# Patient Record
Sex: MALE | Race: WHITE | NOT HISPANIC OR LATINO | Employment: FULL TIME | URBAN - METROPOLITAN AREA
[De-identification: names, ages, dates, MRNs, and addresses within clinical notes are randomized per-mention and may not be internally consistent; named-entity substitution may affect disease eponyms.]

---

## 2023-08-23 ASSESSMENT — ACTIVITIES OF DAILY LIVING (ADL)
HOW_WOULD_YOU_RATE_YOUR_CURRENT_LEVEL_OF_FUNCTION_DURING_YOUR_USUAL_ACTIVITIES_OF_DAILY_LIVING_FROM_0_TO_100_WITH_100_BEING_YOUR_LEVEL_OF_FUNCTION_PRIOR_TO_YOUR_HIP_PROBLEM_AND_0_BEING_THE_INABILITY_TO_PERFORM_ANY_OF_YOUR_USUAL_DAILY_ACTIVITIES?: 60
SITTING FOR 15 MINUTES: SLIGHT DIFFICULTY
LIGHT_TO_MODERATE_WORK: MODERATE DIFFICULTY
ROLLING OVER IN BED: MODERATE DIFFICULTY
RECREATIONAL ACTIVITIES: SLIGHT DIFFICULTY
PUTTING ON SOCKS AND SHOES: SLIGHT DIFFICULTY
WALKING_UP_STEEP_HILLS: NO DIFFICULTY AT ALL
WALKING_15_MINUTES_OR_GREATER: NO DIFFICULTY AT ALL
HEAVY_WORK: SLIGHT DIFFICULTY
STANDING FOR 15 MINUTES: SLIGHT DIFFICULTY
HOS_ADL_SCORE(%): 79.69
GETTING INTO AND OUT OF AN AVERAGE CAR: MODERATE DIFFICULTY
STEPPING UP AND DOWN CURBS: NO DIFFICULTY AT ALL
DEEP SQUATTING: SLIGHT DIFFICULTY
WALKING_DOWN_STEEP_HILLS: NO DIFFICULTY AT ALL
GOING UP 1 FLIGHT OF STAIRS: NO DIFFICULTY AT ALL
HOS_ADL_ITEM_SCORE_TOTAL: 51
WALKING_APPROXIMATELY_10_MINUTES: NO DIFFICULTY AT ALL
WALKING_INITIALLY: SLIGHT DIFFICULTY
GOING DOWN 1 FLIGHT OF STAIRS: NO DIFFICULTY AT ALL
HOS_ADL_HIGHEST_POTENTIAL_SCORE: 64
TWISTING/PIVOTING ON INVOLVED LEG: MODERATE DIFFICULTY

## 2023-08-24 ENCOUNTER — THERAPY VISIT (OUTPATIENT)
Dept: PHYSICAL THERAPY | Facility: CLINIC | Age: 28
End: 2023-08-24
Payer: COMMERCIAL

## 2023-08-24 DIAGNOSIS — M25.552 HIP PAIN, LEFT: Primary | ICD-10-CM

## 2023-08-24 PROCEDURE — 97110 THERAPEUTIC EXERCISES: CPT | Mod: GP | Performed by: PHYSICAL THERAPIST

## 2023-08-24 PROCEDURE — 97161 PT EVAL LOW COMPLEX 20 MIN: CPT | Mod: GP | Performed by: PHYSICAL THERAPIST

## 2023-08-24 NOTE — PROGRESS NOTES
"PHYSICAL THERAPY EVALUATION  Type of Visit: Evaluation    See electronic medical record for Abuse and Falls Screening details.    Subjective       Presenting condition or subjective complaint: Piriformis / Sciatica pain  Date of onset: 04/24/23    Relevant medical history:     Dates & types of surgery:      Pt reports pain has been present for about four months (on or about 04/24/2023) without specific incident.  Has had off and on bouts of \"sciatica\" over the years but this feels different.  Pain is deep in L buttock.  Tough sitting, sit to stand (and first 10-15 seconds of walking), rolling in bed, getting in and out of car.  Can be better with piriformis stretching but is only temporary.  Worse first thing in the morning.  Pain pretty localized to that spot and is occasionally sharp.  May be getting a bit worse    Prior diagnostic imaging/testing results:       Prior therapy history for the same diagnosis, illness or injury: No      Prior Level of Function  Transfers: Independent  Ambulation: Independent  ADL: Independent    Living Environment  Social support: With a significant other or spouse   Type of home: House   Stairs to enter the home: Yes 5 Is there a railing: Yes   Ramp: No   Stairs inside the home: Yes 15 Is there a railing: Yes   Help at home:    Equipment owned:       Employment: Yes   Hobbies/Interests: golf, hiking, exercise, cooking    Patient goals for therapy: Sit, stand up from sitting, be comfortable, roll over in bed       Objective   LUMBAR SPINE EVALUATION  INTEGUMENTARY (edema, incisions): WNL  POSTURE: Sitting Posture: Lordosis decreased  GAIT: Pt ambulates without device without gross asymmetry  ROM: Lumbar flexion without restriction, increased / no worse on single and repetition.  Lumbar extension with mild restriction, increased / no worse on single and repetition.  No restriction or symptoms with B SG.  REIL decrease / no better.  PELVIC/SI SCREEN: Negative Grant, " thigh thrust, FADIR B.  STRENGTH: TA MMT 1/5.  MYOTOMES: 5/5 MMT B hip flexion, knee flexion and extension, ankle DF and eversion, great toe extension.  DERMATOMES: WNL  NEURAL TENSION: Positive SLR and slump in sitting and supine  FLEXIBILITY: Decreased hamstrings L, Decreased hamstrings R  LUMBAR/HIP Special Tests: Negative PROM hip IR and ER B.   PALPATION: No tenderness to palpation.  SPINAL SEGMENTAL CONCLUSIONS: Negative PAs L1-5.    Assessment & Plan   CLINICAL IMPRESSIONS  Medical Diagnosis: L hip pain    Treatment Diagnosis: L hip pain   Impression/Assessment: Patient is a 28 year old male with L hip complaints.  The following significant findings have been identified: Pain, Decreased ROM/flexibility, Decreased strength, and Impaired posture. These impairments interfere with their ability to perform self care tasks as compared to previous level of function.     Clinical Decision Making (Complexity):  Clinical Presentation: Stable/Uncomplicated  Clinical Presentation Rationale: based on medical and personal factors listed in PT evaluation  Clinical Decision Making (Complexity): Low complexity    PLAN OF CARE  Treatment Interventions:  Interventions: Manual Therapy, Neuromuscular Re-education, Therapeutic Activity, Therapeutic Exercise    Long Term Goals     PT Goal 1  Goal Description: Minutes pt will be able to sit: 60  Rationale: to maximize safety and independence within the community  Goal Progress: Minutes pt can sit: 30  Target Date: 10/05/23      Frequency of Treatment: once per week  Duration of Treatment: six weeks    Education Assessment:   Learner/Method: Patient  Education Comments: Performance in clinic    Risks and benefits of evaluation/treatment have been explained.   Patient/Family/caregiver agrees with Plan of Care.     Evaluation Time:     PT Eval, Low Complexity Minutes (54181): 25     Signing Clinician: Karl Mcpherson PT

## 2023-09-13 ENCOUNTER — THERAPY VISIT (OUTPATIENT)
Dept: PHYSICAL THERAPY | Facility: CLINIC | Age: 28
End: 2023-09-13
Payer: COMMERCIAL

## 2023-09-13 DIAGNOSIS — M25.552 HIP PAIN, LEFT: Primary | ICD-10-CM

## 2023-09-13 PROCEDURE — 97140 MANUAL THERAPY 1/> REGIONS: CPT | Mod: GP | Performed by: PHYSICAL THERAPIST

## 2023-09-13 PROCEDURE — 97110 THERAPEUTIC EXERCISES: CPT | Mod: GP | Performed by: PHYSICAL THERAPIST

## 2023-09-27 ENCOUNTER — THERAPY VISIT (OUTPATIENT)
Dept: PHYSICAL THERAPY | Facility: CLINIC | Age: 28
End: 2023-09-27
Payer: COMMERCIAL

## 2023-09-27 DIAGNOSIS — M25.552 HIP PAIN, LEFT: Primary | ICD-10-CM

## 2023-09-27 PROCEDURE — 97110 THERAPEUTIC EXERCISES: CPT | Mod: GP | Performed by: PHYSICAL THERAPIST

## 2023-09-27 NOTE — PROGRESS NOTES
09/27/23 0500   Appointment Info   Signing clinician's name / credentials Karl Mcpherson PT   Total/Authorized Visits 6   Visits Used 3   Medical Diagnosis L hip pain   PT Tx Diagnosis L hip pain   Quick Adds Self Referred   Self Referred   Self Referred (PT) Yes   MD order needed by: 11/22/2023   Progress Note/Certification   Start of Care Date 08/24/23   Onset of illness/injury or Date of Surgery 04/24/23   Therapy Frequency once per week   Predicted Duration six weeks   Progress Note Completed Date 08/24/23   PT Goal 1   Goal Description Minutes pt will be able to sit: 60   Rationale to maximize safety and independence within the community   Goal Progress No restrictions   Target Date 10/05/23   Date Met 09/27/23   Subjective Report   Subjective Report Pt reports significant improvement.  Really hasn't had pain for about five days.  Sleeping much better.   Objective Measure 1   Details No tenderness to palpation.  Negative lumbar AROM all directions.  Positive slump L.   Treatment Interventions (PT)   Interventions Therapeutic Procedure/Exercise   Therapeutic Procedure/Exercise   Therapeutic Procedures: strength, endurance, ROM, flexibillity minutes (70006) 15   PTRx Ther Proc 1 Seated Piriformis Stretch   PTRx Ther Proc 1 - Details 09/27 Pt indicates this doesn't provide much of a stretch any longer.  Can continue as desired.   PTRx Ther Proc 2 Standing Hamstring Stretch   PTRx Ther Proc 2 - Details 09/27 Encourage consistency.   PTRx Ther Proc 3 Supine Hamstring Stretch   PTRx Ther Proc 3 - Details 09/27 OK to continue.   PTRx Ther Proc 4 Short Arc Knee Extension   PTRx Ther Proc 4 - Details 09/27 Pt indicates he feels like he can move farther now than previous.  Can add ankle DF and cervical flexion over time as this gets easier.   PTRx Ther Proc 5 Clamshell Feet together   PTRx Ther Proc 5 - Details 09/27 OK to take out given progression.   PTRx Ther Proc 6 Gluteal Myofascial Piriformis Cruncher   PTRx  Ther Proc 6 - Details 09/27 OK to take out given progression.   Skilled Intervention Implementation of HEP for independence moving forward.   Patient Response/Progress See above.   PTRx Ther Proc 7 Monster Walks   PTRx Ther Proc 7 - Details 09/27 Added today with green band.  Pt noted was challenging but not painful.  Can gradually lower the band as it gets easier.   PTRx Ther Proc 8 Side Stepping With Theraband   PTRx Ther Proc 8 - Details 09/27 Added today with green band.  Pt noted was challenging but not painful.  Can gradually lower the band as it gets easier.   Total Session Time   Timed Code Treatment Minutes 15   Total Treatment Time (sum of timed and untimed services) 15         DISCHARGE  Reason for Discharge: Patient has met all goals.    Equipment Issued: N/A    Discharge Plan: Given progress, pt agrees discharge to HEP but will let me know if there are further issues.    Referring Provider:  Referred Self

## 2023-10-22 ENCOUNTER — HEALTH MAINTENANCE LETTER (OUTPATIENT)
Age: 28
End: 2023-10-22

## 2023-12-31 ASSESSMENT — ENCOUNTER SYMPTOMS
NERVOUS/ANXIOUS: 0
JOINT SWELLING: 0
DIARRHEA: 0
NAUSEA: 0
ABDOMINAL PAIN: 0
FREQUENCY: 0
DIZZINESS: 0
COUGH: 0
FEVER: 0
CHILLS: 0
HEARTBURN: 0
WEAKNESS: 0
DYSURIA: 0
HEMATOCHEZIA: 0
CONSTIPATION: 0
EYE PAIN: 0
ARTHRALGIAS: 0
HEMATURIA: 0
PARESTHESIAS: 0
MYALGIAS: 0
SORE THROAT: 0
SHORTNESS OF BREATH: 0
HEADACHES: 0
PALPITATIONS: 0

## 2024-01-04 ENCOUNTER — OFFICE VISIT (OUTPATIENT)
Dept: FAMILY MEDICINE | Facility: CLINIC | Age: 29
End: 2024-01-04
Payer: COMMERCIAL

## 2024-01-04 VITALS
HEART RATE: 85 BPM | TEMPERATURE: 99.1 F | OXYGEN SATURATION: 96 % | DIASTOLIC BLOOD PRESSURE: 74 MMHG | WEIGHT: 220 LBS | BODY MASS INDEX: 29.8 KG/M2 | HEIGHT: 72 IN | SYSTOLIC BLOOD PRESSURE: 127 MMHG

## 2024-01-04 DIAGNOSIS — Z00.00 ROUTINE GENERAL MEDICAL EXAMINATION AT A HEALTH CARE FACILITY: Primary | ICD-10-CM

## 2024-01-04 DIAGNOSIS — Z11.59 NEED FOR HEPATITIS C SCREENING TEST: ICD-10-CM

## 2024-01-04 DIAGNOSIS — Z11.4 SCREENING FOR HIV (HUMAN IMMUNODEFICIENCY VIRUS): ICD-10-CM

## 2024-01-04 DIAGNOSIS — Z23 NEED FOR TDAP VACCINATION: ICD-10-CM

## 2024-01-04 DIAGNOSIS — Z23 INFLUENZA VACCINE NEEDED: ICD-10-CM

## 2024-01-04 DIAGNOSIS — Z11.4 ENCOUNTER FOR SCREENING FOR HIV: ICD-10-CM

## 2024-01-04 PROCEDURE — 86803 HEPATITIS C AB TEST: CPT | Performed by: NURSE PRACTITIONER

## 2024-01-04 PROCEDURE — 90686 IIV4 VACC NO PRSV 0.5 ML IM: CPT | Performed by: NURSE PRACTITIONER

## 2024-01-04 PROCEDURE — 90715 TDAP VACCINE 7 YRS/> IM: CPT | Performed by: NURSE PRACTITIONER

## 2024-01-04 PROCEDURE — 87389 HIV-1 AG W/HIV-1&-2 AB AG IA: CPT | Performed by: NURSE PRACTITIONER

## 2024-01-04 PROCEDURE — 99385 PREV VISIT NEW AGE 18-39: CPT | Mod: 25 | Performed by: NURSE PRACTITIONER

## 2024-01-04 PROCEDURE — 90472 IMMUNIZATION ADMIN EACH ADD: CPT | Performed by: NURSE PRACTITIONER

## 2024-01-04 PROCEDURE — 80048 BASIC METABOLIC PNL TOTAL CA: CPT | Performed by: NURSE PRACTITIONER

## 2024-01-04 PROCEDURE — 90471 IMMUNIZATION ADMIN: CPT | Performed by: NURSE PRACTITIONER

## 2024-01-04 PROCEDURE — 36415 COLL VENOUS BLD VENIPUNCTURE: CPT | Performed by: NURSE PRACTITIONER

## 2024-01-04 ASSESSMENT — ENCOUNTER SYMPTOMS
NERVOUS/ANXIOUS: 0
EYE PAIN: 0
FREQUENCY: 0
SHORTNESS OF BREATH: 0
CONSTIPATION: 0
FEVER: 0
PALPITATIONS: 0
SORE THROAT: 0
MYALGIAS: 0
HEARTBURN: 0
DIZZINESS: 0
DIARRHEA: 0
ABDOMINAL PAIN: 0
HEMATOCHEZIA: 0
PARESTHESIAS: 0
HEMATURIA: 0
WEAKNESS: 0
HEADACHES: 0
CHILLS: 0
JOINT SWELLING: 0
ARTHRALGIAS: 0
DYSURIA: 0
NAUSEA: 0
COUGH: 0

## 2024-01-04 ASSESSMENT — PAIN SCALES - GENERAL: PAINLEVEL: NO PAIN (0)

## 2024-01-04 NOTE — LETTER
January 8, 2024    Emmanuel Perez  [address] [address]          Dear ,    We are writing to inform you of your test results.    Lab results are normal. Continue a healthy lifestyle.       Resulted Orders   Hepatitis C Screen Reflex to HCV RNA Quant and Genotype   Result Value Ref Range    Hepatitis C Antibody Nonreactive Nonreactive      Comment:      A nonreactive screening test result does not exclude the possibility of exposure to or infection with HCV. Nonreactive screening test results in individuals with prior exposure to HCV may be due to antibody levels below the limit of detection of this assay or lack of reactivity to the HCV antigens used in this assay. Patients with recent HCV infections (<3 months from time of exposure) may have false-negative HCV antibody results due to the time needed for seroconversion (average of 8 to 9 weeks).   Basic metabolic panel  (Ca, Cl, CO2, Creat, Gluc, K, Na, BUN)   Result Value Ref Range    Sodium 140 135 - 145 mmol/L      Comment:      Reference intervals for this test were updated on 09/26/2023 to more accurately reflect our healthy population. There may be differences in the flagging of prior results with similar values performed with this method. Interpretation of those prior results can be made in the context of the updated reference intervals.     Potassium 4.6 3.4 - 5.3 mmol/L    Chloride 103 98 - 107 mmol/L    Carbon Dioxide (CO2) 27 22 - 29 mmol/L    Anion Gap 10 7 - 15 mmol/L    Urea Nitrogen 14.2 6.0 - 20.0 mg/dL    Creatinine 1.06 0.67 - 1.17 mg/dL    GFR Estimate >90 >60 mL/min/1.73m2    Calcium 9.7 8.6 - 10.0 mg/dL    Glucose 88 70 - 99 mg/dL   HIV Antigen Antibody Combo   Result Value Ref Range    HIV Antigen Antibody Combo Nonreactive Nonreactive      Comment:      Negative HIV-1/-2 antigen and antibody screening test results usually indicate the absence of HIV-1 and HIV-2 infection. However, such negative results do  not rule-out acute HIV infection.  If acute HIV-1 or HIV-2 infection is suspected, detection of HIV-1 or HIV-2 RNA  is recommended.        If you have any questions or concerns, please call the clinic at the number listed above.       Sincerely,      JEREL Vargas CNP

## 2024-01-04 NOTE — COMMUNITY RESOURCES LIST (ENGLISH)
01/04/2024   [employer]  N/A  For questions about this resource list or additional care needs, please contact your primary care clinic or care manager.  Phone: [phone]   Email: N/A   Address: [facility] [place] [address]   Hours: N/A        Hotlines and Helplines       Hotline - Housing crisis  1  [facility] Housing Resource Line Distance: 7.32 miles      Phone/Virtual   [address] [place] [address]  Language: English  Hours: Mon - Sun Open 24 Hours   Phone: (467) 126-7315 Website: [email or web address].[email or web address]/Basic-Needs     2  [facility] Distance: 11.22 miles      Phone/Virtual   [address] [place] [address]  Language: English  Hours: Mon - Sun Open 24 Hours   Phone: (509) 502-2064 Email: communications@[facility].org Website: https://[facility].org/oursaviourshousing/          Housing       Coordinated Entry access point  3  [facility]  Office - [facility] Distance: 2.24 miles      Phone/Virtual   [address] [place] [address]  Language: English  Hours: Mon - Fri 8:30 AM - 12:00 PM , Mon - Fri 1:00 PM - 4:00 PM  Fees: Free   Phone: (586) 759-3166 Ext.2 Email: sherry@[facility].org Website: https://www.[facility].org/usn/     4  [facility] Housing Services Distance: 11.32 miles      In-Person   [address] [place] [address]  Language: English  Hours: Mon - Fri 9:00 AM - 5:00 PM  Fees: Free   Phone: (613) 670-7011 Email: housing@[facility].org Website: http://www.[facility].org/housing     Drop-in center or day shelter  5  [facility] Distance: 9.58 miles      In-Person   [address] [place] [address]  Language: English, Hmong, Sierra Leonean, Japanese  Hours: Mon - Thu 8:30 AM - 4:30 PM , Sat - Sun 9:00 AM - 12:00 PM  Fees: Free   Phone: (952) 139-8381 Email: info@[employer].org Website: https://[employer].org/     6  [facility]  [facility] Distance: 10.79 miles      In-Person   [address] [place] [address]  Language: English, Hong Konger, Macanese  Hours: Mon - Sat 7:00 AM - 3:00 PM  Fees: Free, Self Pay   Phone: (454) 460-6016 Email: info@[employer] Website: https://www.[employer]/locations/[facility]/     Housing search assistance  7  [facility] ([facility] - [facility] Rental Housing Distance: 2.26 miles      In-Person, Phone/Virtual   [address] [place] [address]  Language: English  Hours: Mon - Fri 8:00 AM - 4:30 PM  Fees: Free   Phone: (878) 664-6220 Email: accap@[facility].org Website: http://www.[employer]     8  [facility] Distance: 3.88 miles      Phone/Virtual   [address] [place] [address]  Language: English, Macanese  Hours: Mon - Fri 9:00 AM - 5:00 PM  Fees: Free   Phone: (480) 649-7602 Email: services@[employer] Website: https://www.[employer]     Shelter for families  9  [facility] Distance: 14.34 miles      In-Person   [address] [place] [address]  Language: English  Hours: Mon - Fri 3:00 PM - 9:00 AM , Sat - Sun Open 24 Hours  Fees: Free   Phone: (445) 171-9220 Ext.1 Website: https://www.[facility].org/2020/07/03/emergency-family-shelter/     Shelter for individuals  10  [facility] Distance: 9.89 miles      In-Person   [address] [place] [address]  Language: English  Hours: Mon - Sun 5:00 PM - 10:00 AM  Fees: Free, Self Pay   Phone: (969) 510-2086 Email: info@[employer] Website: https://www.[employer]/locations/higher-ground-shelter/     11  [facility] Distance: 9.93 miles      In-Person   [address] [place] [address]  Language: English  Hours: Mon - Fri 4:00 PM - 9:00 AM  Fees: Free    Phone: (330) 685-4066 Email: fabien@[facility].[employer].org Website: https://[facility].org/[facility]/          Important Numbers & Websites       Emergency Services   911  [facility] Services   311  Poison Control   (586) 984-3174  Suicide Prevention Lifeline   (740) 668-5905 (TALK)  Child Abuse Hotline   (310) 572-7383 (4-A-Child)  Sexual Assault Hotline   (236) 158-5079 (HOPE)  National Runaway Safeline   (257) 723-5733 (RUNAWAY)  All-Options Talkline   (138) 232-4349  Substance Abuse Referral   (384) 916-1587 (HELP)

## 2024-01-04 NOTE — PROGRESS NOTES
SUBJECTIVE:   Emmanuel is a 28 year old, presenting for the following:  Physical  Liborio Joseph Perez 28 year old male presenting for an annual physical examination.   Pt reports feeling generally well with no acute complaints.  Pt mentions a stable exercise routine and a balanced diet.  Pt is not up to date on his immunizations and wants to take influenza and TDAP vaccines.       1/4/2024     2:52 PM   Additional Questions   Roomed by cam   Accompanied by none         1/4/2024     2:52 PM   Patient Reported Additional Medications   Patient reports taking the following new medications none       Healthy Habits:     Getting at least 3 servings of Calcium per day:  Yes    Bi-annual eye exam:  Yes    Dental care twice a year:  Yes    Sleep apnea or symptoms of sleep apnea:  None    Diet:  Regular (no restrictions)    Frequency of exercise:  4-5 days/week    Duration of exercise:  30-45 minutes    Taking medications regularly:  Not Applicable    Barriers to taking medications:  Not applicable    Medication side effects:  Not applicable    Additional concerns today:  No      Today's PHQ-2 Score:       1/4/2024     2:41 PM   PHQ-2 ( 1999 [employer])   Q1: Little interest or pleasure in doing things 0   Q2: Feeling down, depressed or hopeless 0   PHQ-2 Score 0   Q1: Little interest or pleasure in doing things Not at all   Q2: Feeling down, depressed or hopeless Not at all   PHQ-2 Score 0                   Have you ever done Advance Care Planning? (For example, a Health Directive, POLST, or a discussion with a medical provider or your loved ones about your wishes): No, advance care planning information given to patient to review.  Patient plans to discuss their wishes with loved ones or provider.      Social History     Tobacco Use    Smoking status: Never    Smokeless tobacco: Never   Substance Use Topics    Alcohol use: Not on file             12/31/2023     1:29 PM   Alcohol Use   Prescreen: >3 drinks/day or >7 drinks/week?  "No       Last PSA: No results found for: \"PSA\"    Reviewed orders with patient. Reviewed health maintenance and updated orders accordingly - Yes  Lab work is in process  Labs reviewed in EPIC  BP Readings from Last 3 Encounters:   01/04/24 127/74    Wt Readings from Last 3 Encounters:   01/04/24 99.8 kg (220 lb)                  Patient Active Problem List   Diagnosis    Hip pain, left     History reviewed. No pertinent surgical history.    Social History     Tobacco Use    Smoking status: Never    Smokeless tobacco: Never   Substance Use Topics    Alcohol use: Not on file     Family History   Problem Relation Age of Onset    Hypertension Mother     Thyroid Disease Father     Hashimoto's thyroiditis Sister     Dementia Maternal Grandmother     Alzheimer Disease Paternal Grandmother          No current outpatient medications on file.     No Known Allergies  No lab results found.     Reviewed and updated as needed this visit by clinical staff   Tobacco  Allergies  Meds              Reviewed and updated as needed this visit by Provider                 History reviewed. No pertinent past medical history.   History reviewed. No pertinent surgical history.    Review of Systems   Constitutional:  Negative for chills and fever.   HENT:  Negative for congestion, ear pain, hearing loss and sore throat.    Eyes:  Negative for pain and visual disturbance.   Respiratory:  Negative for cough and shortness of breath.    Cardiovascular:  Negative for chest pain, palpitations and peripheral edema.   Gastrointestinal:  Negative for abdominal pain, constipation, diarrhea, heartburn, hematochezia and nausea.   Genitourinary:  Negative for dysuria, frequency, genital sores, hematuria, impotence, penile discharge and urgency.   Musculoskeletal:  Negative for arthralgias, joint swelling and myalgias.   Skin:  Negative for rash.   Neurological:  Negative for dizziness, weakness, headaches and paresthesias.   Psychiatric/Behavioral:  " Negative for mood changes. The patient is not nervous/anxious.          OBJECTIVE:   There were no vitals taken for this visit.    Physical Exam  GENERAL: healthy, alert and no distress  EYES: Eyes grossly normal to inspection, PERRL and conjunctivae and sclerae normal  HENT: ear canals and TM's normal, nose and mouth without ulcers or lesions  NECK: no adenopathy, no asymmetry, masses, or scars and thyroid normal to palpation  RESP: lungs clear to auscultation - no rales, rhonchi or wheezes  CV: regular rate and rhythm, normal S1 S2, no S3 or S4, no murmur, click or rub, no peripheral edema and peripheral pulses strong  ABDOMEN: soft, nontender, no hepatosplenomegaly, no masses and bowel sounds normal  MS: no gross musculoskeletal defects noted, no edema  SKIN: no suspicious lesions or rashes  NEURO: Normal strength and tone, mentation intact and speech normal  PSYCH: mentation appears normal, affect normal/bright    Diagnostic Test Results:  Labs reviewed in Epic    ASSESSMENT/PLAN:   (Z00.00) Routine general medical examination at a health care facility  (primary encounter diagnosis)  Plan: REVIEW OF HEALTH MAINTENANCE PROTOCOL ORDERS,         Basic metabolic panel  (Ca, Cl, CO2, Creat,         Gluc, K, Na, BUN)  -Reviewed recommendations for screening, immunizations, and discussed healthy lifestyle choices.  -Counseled patient on medication adherence   -F/u 1 year for next annual physical  - RTO sooner prn       (Z11.59) Need for hepatitis C screening test  Plan: Hepatitis C Screen Reflex to HCV RNA Quant and         Genotype      (Z23) Need for Tdap vaccination  Plan: TDAP 7+ (ADACEL,BOOSTRIX), SCREENING QUESTIONS         FOR ADULT IMMUNIZATIONS    (Z23) Influenza vaccine needed  Plan: INFLUENZA VACCINE IM > 6 MONTHS VALENT IIV4         (AFLURIA/FLUZONE), SCREENING QUESTIONS FOR         ADULT IMMUNIZATIONS      (Z11.4) Encounter for screening for HIV  Plan: HIV Antigen Antibody Combo            COUNSELING:    Reviewed preventive health counseling, as reflected in patient instructions       Regular exercise       Healthy diet/nutrition       Vision screening       Immunizations  Vaccinated for: Influenza and TDAP and Declined: Covid-19 due to Other wants to take Influenza and TDAP today           He reports that he has never smoked. He has never used smokeless tobacco.            JEREL Vargas CNP  [facility]

## 2024-01-05 LAB
ANION GAP SERPL CALCULATED.3IONS-SCNC: 10 MMOL/L (ref 7–15)
BUN SERPL-MCNC: 14.2 MG/DL (ref 6–20)
CALCIUM SERPL-MCNC: 9.7 MG/DL (ref 8.6–10)
CHLORIDE SERPL-SCNC: 103 MMOL/L (ref 98–107)
CREAT SERPL-MCNC: 1.06 MG/DL (ref 0.67–1.17)
DEPRECATED HCO3 PLAS-SCNC: 27 MMOL/L (ref 22–29)
EGFRCR SERPLBLD CKD-EPI 2021: >90 ML/MIN/1.73M2
GLUCOSE SERPL-MCNC: 88 MG/DL (ref 70–99)
HCV AB SERPL QL IA: NONREACTIVE
HIV 1+2 AB+HIV1 P24 AG SERPL QL IA: NONREACTIVE
POTASSIUM SERPL-SCNC: 4.6 MMOL/L (ref 3.4–5.3)
SODIUM SERPL-SCNC: 140 MMOL/L (ref 135–145)

## 2024-04-22 ENCOUNTER — OFFICE VISIT (OUTPATIENT)
Dept: FAMILY MEDICINE | Facility: CLINIC | Age: 29
End: 2024-04-22
Payer: COMMERCIAL

## 2024-04-22 VITALS
SYSTOLIC BLOOD PRESSURE: 133 MMHG | WEIGHT: 220.6 LBS | TEMPERATURE: 99.5 F | HEART RATE: 94 BPM | OXYGEN SATURATION: 97 % | DIASTOLIC BLOOD PRESSURE: 71 MMHG | BODY MASS INDEX: 30.14 KG/M2

## 2024-04-22 DIAGNOSIS — J02.9 SORE THROAT: ICD-10-CM

## 2024-04-22 DIAGNOSIS — R05.1 ACUTE COUGH: Primary | ICD-10-CM

## 2024-04-22 LAB
DEPRECATED S PYO AG THROAT QL EIA: NEGATIVE
FLUAV RNA SPEC QL NAA+PROBE: NEGATIVE
FLUBV RNA RESP QL NAA+PROBE: NEGATIVE
GROUP A STREP BY PCR: NOT DETECTED
RSV RNA SPEC NAA+PROBE: NEGATIVE
SARS-COV-2 RNA RESP QL NAA+PROBE: NEGATIVE

## 2024-04-22 PROCEDURE — 87637 SARSCOV2&INF A&B&RSV AMP PRB: CPT

## 2024-04-22 PROCEDURE — 87651 STREP A DNA AMP PROBE: CPT

## 2024-04-22 PROCEDURE — 99213 OFFICE O/P EST LOW 20 MIN: CPT

## 2024-04-22 RX ORDER — AZITHROMYCIN 250 MG/1
TABLET, FILM COATED ORAL
Qty: 6 TABLET | Refills: 0 | Status: SHIPPED | OUTPATIENT
Start: 2024-04-22 | End: 2024-04-27

## 2024-04-22 ASSESSMENT — ENCOUNTER SYMPTOMS: COUGH: 1

## 2024-04-22 NOTE — PROGRESS NOTES
"  Assessment & Plan     Acute cough  Influenza, COVID and RSV collected. If positive, will treat accordingly. If negative, due to wedding this weekend, plan for patient to use azithromycin. Discussed that we are unable to rule out viral nature and that antibiotic may not work and patient is agreeable. Discussed supportive measures.   - azithromycin (ZITHROMAX) 250 MG tablet; Take 2 tablets (500 mg) by mouth daily for 1 day, THEN 1 tablet (250 mg) daily for 4 days.  - Symptomatic Influenza A/B, RSV, & SARS-CoV2 PCR (COVID-19) Nose    Sore throat  Rapid strep negative. Awaiting PCR.   - Streptococcus A Rapid Screen w/Reflex to PCR - Clinic Collect  - Group A Streptococcus PCR Throat Swab            BMI  Estimated body mass index is 30.14 kg/m  as calculated from the following:    Height as of 1/4/24: 1.822 m (5' 11.73\").    Weight as of this encounter: 100.1 kg (220 lb 9.6 oz).             Kentrell Benitez is a 29 year old, presenting for the following health issues:  Cough      4/22/2024     1:49 PM   Additional Questions   Roomed by linda Krause    History of Present Illness       Reason for visit:  Sore throat/productive cough  Symptom onset:  Today  Symptoms include:  Sore throat, productive cough, headache  Symptom intensity:  Moderate  Symptom progression:  Staying the same  Had these symptoms before:  No    He eats 2-3 servings of fruits and vegetables daily.He consumes 0 sweetened beverage(s) daily.He exercises with enough effort to increase his heart rate 30 to 60 minutes per day.  He exercises with enough effort to increase his heart rate 6 days per week.   He is taking medications regularly.         Acute Illness  Acute illness concerns: cough, sore throat,   Onset/Duration: last night and this morning  Symptoms:  Fever: YES  Chills/Sweats: YES- chills  Headache (location?): YES- frontal  Sinus Pressure: No  Conjunctivitis:  No  Ear Pain: YES- maybe  Rhinorrhea: No  Congestion: YES  Sore Throat: " YES  Cough: YES-productive  Wheeze: No  Decreased Appetite: No  Nausea: No  Vomiting: No  Diarrhea: No  Dysuria/Freq.: No  Dysuria or Hematuria: No  Fatigue/Achiness: YES  Sick/Strep Exposure: YES- fiance was sick  Therapies tried and outcome: None    Pt is getting  this Saturday and would like to know if there is anything he can do.         Objective    /71 (BP Location: Left arm, Patient Position: Sitting, Cuff Size: Adult Regular)   Pulse 94   Temp 99.5  F (37.5  C) (Temporal)   Wt 100.1 kg (220 lb 9.6 oz)   SpO2 97%   BMI 30.14 kg/m    Body mass index is 30.14 kg/m .  Physical Exam   GENERAL: alert and no distress  EYES: Eyes grossly normal to inspection, PERRL and conjunctivae and sclerae normal  HENT: ear canals and TM's normal, nose and mouth without ulcers or lesions  NECK: no adenopathy, no asymmetry, masses, or scars  RESP: lungs clear to auscultation - no rales, rhonchi or wheezes  CV: regular rate and rhythm, normal S1 S2, no S3 or S4, no murmur, click or rub, no peripheral edema            Signed Electronically by: JEREL Venegas CNP

## 2024-04-23 NOTE — RESULT ENCOUNTER NOTE
Hi Emmanuel,     Your Influenza, RSV and COVID test came back negative. You can start the azithromycin if you continue to have symptoms.      Feel free to contact me via [employer] or call the clinic at [phone].     Sincerely,  Erika Nunes, ROS, FNP-C

## 2024-04-24 ENCOUNTER — MYC MEDICAL ADVICE (OUTPATIENT)
Dept: FAMILY MEDICINE | Facility: CLINIC | Age: 29
End: 2024-04-24
Payer: COMMERCIAL

## 2024-12-31 ENCOUNTER — PATIENT OUTREACH (OUTPATIENT)
Dept: CARE COORDINATION | Facility: CLINIC | Age: 29
End: 2024-12-31
Payer: COMMERCIAL

## 2025-02-16 ENCOUNTER — HEALTH MAINTENANCE LETTER (OUTPATIENT)
Age: 30
End: 2025-02-16

## 2025-04-02 PROBLEM — E66.3 OVERWEIGHT WITH BODY MASS INDEX (BMI) 25.0-29.9: Status: ACTIVE | Noted: 2022-04-22

## 2025-04-03 ENCOUNTER — ALLIED HEALTH/NURSE VISIT (OUTPATIENT)
Dept: RESEARCH | Facility: CLINIC | Age: 30
End: 2025-04-03
Payer: COMMERCIAL

## 2025-04-03 VITALS
TEMPERATURE: 98 F | HEIGHT: 73 IN | SYSTOLIC BLOOD PRESSURE: 131 MMHG | HEART RATE: 75 BPM | RESPIRATION RATE: 20 BRPM | OXYGEN SATURATION: 98 % | BODY MASS INDEX: 28.49 KG/M2 | DIASTOLIC BLOOD PRESSURE: 87 MMHG | WEIGHT: 215 LBS

## 2025-04-03 DIAGNOSIS — Z00.6 EXAMINATION OF PARTICIPANT OR CONTROL IN CLINICAL RESEARCH: Primary | ICD-10-CM

## 2025-04-03 NOTE — PROGRESS NOTES
"  [place] Study Physical Exam  Study Description: The purpose of this study is to explore potential relationships between physiologic parameters collected from sensor data with physiological changes potentially induced by the administration of the COVID-19 vaccine.     Medical History Reviewed? Yes  After extensive review of the entire available medical record, to the best of my knowledge there is no reason to exclude this patient from the study.     Patient has never had surgery in the past   Medical Decision Making (include details from chart review, discussion with Dr. BECKER, etc): N/A     Physical Examination  For abnormal findings, please evaluate if the finding is Clinically Significant (by 'CS') or Not Clinically Significant (by 'NCS')  General Appearance   Normal  Head and Neck   Normal  Lungs     Normal  Cardiovascular   Normal   Do they have a stimulator/Pacemaker? No  Gastrointestinal   Normal   Problems swallowing medication? No  ANY history of diverticula (diverticulosis, diverticulitis, etc): No  Any history of GI surgery? No  Bowel habits: once a day   Regular laxative use? No  Musculoskeletal/Extremities Normal   Lymph Nodes    Normal  Skin     Normal     Any Tattoos or Skin issues on the wrists or deltoid? No  Neurological      Any sleep disturbances? (Must get at least 5 hours a night) No  Patient sleeps about 6 to 7 hours at night   Memory issues?  No  Tremor (If present document)  Absent  Any balance issues or recent falls?     No    Vitals:    04/03/25 0916   BP: 131/87   BP Location: Left arm   Patient Position: Sitting   Cuff Size: Adult Large   Pulse: 75   Resp: 20   Temp: 98  F (36.7  C)   TempSrc: Oral   SpO2: 98%   Weight: 97.5 kg (215 lb)   Height: 1.854 m (6' 1\")             Immunization History   Administered Date(s) Administered    COVID-19 Vaccine (C[place]on) 03/31/2021, 11/18/2021    DTAP (<7y) 08/08/1997, 05/08/2001    Flu, Unspecified 11/17/2006    HPV9 (Gardasil) 01/22/2019, 02/19/2019, " 08/23/2019    HepB, Unspecified 1996, 1996, 1996    Influenza (IIV3) PF 10/09/2018    Influenza Vaccine >6 months,quad, PF 01/04/2024    MMR (MMRII) 05/09/1997, 05/08/2001    Meningococcal ACWY (Menactra ) 06/21/2010, 05/15/2015    OPV, trivalent, live 1996, 1996, 08/08/1997    Poliovirus, inactivated (IPV) 05/08/2001    TDAP (Adacel,Boostrix) 06/21/2010, 01/04/2024    TRIHIBIT (DTAP/HIB, <7y) 1996, 1996, 1996    Varicella (Varivax) 08/30/2002, 06/21/2010       Reminders:  Are they using prescription pain meds? No  Any first degree relatives with inflammatory bowel disease? (Crohn's, ulcerative colitis, etc) No  Any serious medical issues that require treatment and evaluation? No  Any conditions they are following closely with their PCP? No  Have you had any serious issues with previous Covid-19 immunizations? No  COVID Vaccine Screening   Have you received a dose of the Covid-19 vaccine before?   Yes, Cameron  Date of most recent Covid-19 vaccine dose:     18-November-2021   Do you currently have a health condition or are undergoing    treatment that makes you moderately to severely immunocompromised?* No  Have you ever had an allergic reaction to a Covid vaccine?**  No  Have you ever had an allergic reaction to another vaccine or injectable  medication?         No  Have you ever felt dizzy or faint before, during or after a shot?   No    *Ex: treatment of cancer, HIV, organ transplant recipient, immunosuppressive therapy, etc.     **This would include a severe allergic reaction (e.g., anaphylaxis that required treatment with epinephrine or caused you to go to the hospital. It would also include an allergic reaction that caused hives, swelling, or respiratory distress, including wheezing)    Is this subject eligible to receive a Covid-19 vaccine? Yes    Patient does fulfill study inclusion criteria and no exclusion criteria are found. Subject will continue in the  study. This decision was made at 9:35    03-APR-2025    Sera Gant NP

## 2025-04-03 NOTE — PROGRESS NOTES
[place] Inclusion/Exclusion Criteria:    Study Name: [place] (-[id number])      : Cindy Solares MD      Study Description: The purpose of this study is to explore potential relationships between physiologic parameters collected from sensor data with physiological changes potentially induced by the administration of the COVID-19 vaccine.     Protocol Version: 5.0 (Version Date: 14-FEB-2025) Consent Version: 5.0 (Version Date: 18-FEB-2025)  The protocol and consent form were consulted to make inclusion and exclusion decisions along with the kahtya dated 27-February-2025 that addresses all [place] Phase 1.0 nuances and provides further clarification for some inclusion/exclusion criteria.     Inclusion #  Inclusion Criteria (ALL MUST BE YES)  YES/NO/N/A   1 Be at least 18 years old  Yes   2 Proficient in written and spoken English, defined by self-report   Yes   3 Willing and able to participate in the study procedures and data consent described in the consent form   Yes   4 Able to communicate effectively with and follow instructions from the Study Team    Yes   5   Eligible to receive the updated COVID-19 vaccine based on current CDC recommendations and vaccine prescribing information. (As determined by Sub[facility]I) Yes   6   Able to disclose home address to a healthcare provider or Study Team member to enable (a) device shipping (if necessary) and (b) a 911 call in case of potential emergency (home address will not be kept as study data)  Yes   7    Able to adhere to Lifestyle Considerations (see Section 5.3) throughout study duration (as applicable). These include avoiding taking certain over-the-counter pain relievers or fever reducing medications around the time of vaccination, not taking any recreational drugs (e.g. methamphetamines, cocaine, opioids, cannabis, LSD)  within 72 hours prior to, during and after the ingestible temperature sensor monitoring period; and abstaining from strenuous  "exercise, ingestion of hot or cold liquids, eating food, chewing gum or mints, brushing teeth or smoking 30 minutes before taking oral temperature measurements.  Yes   8   Participant has their own reliable high-speed broadband internet at their home and active at the time of data collection  Yes   9   Have a personal computer, desktop, laptop, tablet, or smartphone with audiovisual capabilities Yes   If any inclusion criteria marked \"No\" please provide detail (If all Yes, N/A): N/A        Exclusion # Exclusion Criteria (ALL MUST BE NO) YES/NO/N/A   1 Participants with tattoos, skin problems or wound(s) on/in the wrist or deltoid (ex: injured or friable skin, skin disorders, or allergic skin reactions, such as eczema, rosacea, impetigo, dermatomyositis, or allergic contact dermatitis), that can interfere with study setup/assessments/vaccination  No   2 Individuals who are pregnant or plan to become pregnant during the study  No   3 Anything that may interfere with proper physiological data acquisition, such as an implantable device (e.g., cardiac pacemaker, automated implantable cardioverter-defibrillator, deep brain stimulator, Inspire upper airway stimulation device) and casts or body braces No   4 Participants that are diagnosed or are suspected to have illnesses affecting motion: e.g., Parkinson's, Essential Tremor, Dystonia, or others at investigator's discretion No   5 Participants that are diagnosed with a condition or taking a medication that impairs the immune system (i.e., active cancer, HIV/AIDS, organ/stem cell transplant recipient, autoimmune disorders, primary immunodeficiencies) No   6 Participants with any medical history, vital sign, or any other study procedure finding/assessment that in the opinion of the investigator could compromise participant safety during study participation or interfere with the study integrity and/or the accurate assessment of the study objectives No   7 Daily use of OTC or " "prescription medications with antipyretic properties at time of enrollment that is anticipated to continue during the CBT sensor data collection period surrounding administration of vaccines. Low dose aspirin (81 mg or less per day) taken for preventative purposes is permissible No   8 Individuals who are unwilling to avoid taking OTC pain relievers and anti-pyrectics for acute mild to moderate pain and fever associated with vaccine administration during the data collection days surrounding its administration No   9 Participant works for a company that develops or sells medical and/or fitness devices (e.g., ECG monitors, wearable fitness bands, sleep monitors, etc.) or are technology journalists (e.g., professional bloggers, TV, magazine, newspaper reporters, etc.) No   10 Overnight travel or travel between time zones planned during CBT sensor data collection nights No   11 Participants with planned overnight travel totaling >= 7 nights during duration of study data collection period No   12 Participant plans on moving or changing address within the study period No   13 Participant is employed in overnight shift work, or otherwise does not maintain a reasonably consistent day/night schedule (e.g., participants who are unable to regularly go to bed between 7pm to 2am and wake up between 4am to 12pm on average >= 3 times a week) No   14 Participants with clinically relevant sleep disturbances and unable to achieve at least 4 hours of continuous sleep on average each night No   If any exclusion criteria marked \"Yes\" please provide detail (If all No, N/A): N/A    Exclusion (a) # Exclusion criteria related to the COVID-19 vaccine:   (ALL MUST BE NO) YES/NO/N/A   1 Participants with a known history of a severe allergic reaction (e.g., anaphylaxis) to any component of the COVID-19 vaccine. No   2 Participants who experienced severe side effects following previous administration of the COVID-19 vaccine including " "myocarditis, pericarditis, thrombosis, or thrombocytopenia No   3 Participants in whom an additional COVID-19 vaccine is contraindicated. No   If any exclusion criteria marked \"Yes\" please provide detail (If all No, N/A): N/A    Exclusion (b) # Exclusion criteria related to Ingestible Temperature Sensor:   (ALL MUST BE NO) YES/NO/N/A   1 Participants under the age of 18 No   2 Participant weighs less than 40 kg (88 lbs.) or BMI greater than 44.6 No   3 Participants who are pregnant No   4 Participants with a known diagnosis of obstructive disease of the gastrointestinal tract or a known hernia, Crohn's disease, diverticulitis, or other inflammatory bowel disease. No   5 Participants with a 1st degree relative with any inflammatory bowel disease with suspected hereditary transmission (e.g., Crohn's disease, or ulcerative colitis) No   6 Participants with known history of disordered or impaired gag reflex  No   7 Participants who have problems swallowing food or pills (e.g., dysphagia) No   8 Participants with previous gastrointestinal tract surgery involving the esophagus, stomach, or intestines, excluding intraluminal endoscopy. No   9 Participants with known diagnosis of hypo-motility disorders of the gastrointestinal tract (including chronic constipation with fewer than three spontaneous bowel movements per week No   10 Participants with chronic diarrhea, as defined by 3 or more episodes of diarrhea per week for the last 30 days or >= 3 bowel movements per day No   11 Participants with known diagnosis of felinization of the esophagus (unusual folding of the esophagus)  No   12 Participants with Zenker's diverticulum (a pouch that forms in the upper part of the esophagus) and people with a history of other diverticula.  No   13 Participants who may undergo NMR or MRI scanning within one week of CBT sensor ingestion No   14 Participants with an implantable pulse generator or implantable electro-medical device of any " "kind (e.g., pacemakers (or implantable pulse generators), implantable cardioverter defibrillators (ICDs), deep brain stimulation (DBS) devices, and left ventricular assist devices (LVADs). No   15 Participants with an implanted or temporarily implanted device that uses an external power-source. No   If any exclusion criteria marked \"Yes\" please provide detail (If all No, N/A): N/A    Will the participant continue in the study? Yes  (If \"No\", follow instructions for handling of Screen Failures)    If the participant is eligible to continue in the study, inclusion/exclusion criteria above will be sent to the PI for co-sign.    Enrollment Date:  03-APR-2025      MD Irving Guevara    "

## 2025-04-03 NOTE — PROGRESS NOTES
[place] Study Consent    Study Description: The purpose of this study is to explore potential relationships between physiologic parameters collected from sensor data with physiological changes potentially induced by the administration of the COVID-19 vaccine.    Liborio Perez a 30 year old male, was on-site today at [facility] to discuss participation for [place] (-[id number])       The consent form was reviewed with the patient.     The review of the study included:  Study Purpose      Participant Duration, Responsibilities & Expectations    Study Data and Devices    Benefits and Risks of Participation    Compensation and Costs of Participation    Coded Study Data  Voluntary Participation    Study Restrictions  Confidentiality Obligations/Privacy-Related Risks   Injury, Legal, and Data Rights    Authorization to Use and Disclose Your Protected Health Information    Protocol Version: 5.0   Principle Investigator: Cindy Solares MD    Subject Number: [id number]      The subject was queried in regards to his willingness to continue and his questions were answered to his satisfaction. The patient has given his agreement to volunteer and participate in the above noted study.     The eConsent and HIPAA form version (Version 5.0 Date 18-Feb-2025) was signed on  03-APR-2025 with the Clinical Research Unit of [facility].     A copy of the [place] consent will be placed in subject's medical record. A copy of the consent form was provided to the subject today.    Study data is directly entered into Epic and [employer] per protocol. No study procedures were done prior to Liborio Perez providing informed consent.       03-APR-2025    Irving Tian

## 2025-04-03 NOTE — PROGRESS NOTES
[place] Screening Study Note  Study Description: The purpose of this study is to explore potential relationships between physiologic parameters collected from sensor data with physiological changes potentially induced by the administration of the COVID-19 vaccine.       Subject ID:      Demographic Info  Liborio Perez   1995          30 year old    SCREENING   Sex: Male    Multi Racial?: No; Primary: White   Ethnicity: Not  or      MEDICAL HISTORY REVIEW  Medical history, medications, allergies, surgical history and familial medical history were reviewed with the participant and verified by chart review.     Medical Conditions:   The table below represents their relevant medical history.   Has the subject experienced any relevant past and/or concomitant Medical History (e.g., chronic conditions such as hypertension, cardiovascular disease, stroke, diabetes, kidney disease, peripheral arterial disease, Raynaud's syndrome? No, no relevant medical history to report.   No past medical history on file.  Add 'Ongoing' or end date to medical condition as applicable    Any History of...  If any of the below are yes, the subject is a screen fail.  -Divertic___ (diverticulosis, diverticula, diverticulitis, etc.)? No  -Rheumatoid arthritis? No  Lupus? No  Hernia? (Other than inguinal or childhood umbilical)  No  Peptic Ulcer? No  Crohn's Disease? No  In any 1st degree family members? No  Colitis? No  Dysphagia? No  Parkinson s? No  Essential Tremor? No      Concomitant Medications:   The table below represents their current prescription, OTC, and supplement medications they are taking on a regular basis/have taken in the last 30 days.          Allergies:   Does the subject have any known allergies to medications, food, a vaccine component, or latex? No  *If the participant has an allergy to something in not one of these 3 categories, include them in the medical history.*  No Known Allergies  "    Surgical History  Any history of gastrointestinal tract surgery involving the esophagus, stomach, or intestines? No  If there are no GI surgeries, delete surgery smartlist section.     No past surgical history on file.    Family Medical History  No first degree relative has a history of any inflammatory bowel disease with suspected hereditary transmission (eg. Crohn's, ulcerative colitis, etc)     Family History   Problem Relation Age of Onset    Hypertension Mother     Thyroid Disease Father     Hashimoto's thyroiditis Sister     Dementia Maternal Grandmother     Alzheimer Disease Paternal Grandmother        Subject Characteristics     Vitals  /87 (BP Location: Left arm, Patient Position: Sitting, Cuff Size: Adult Large)   Pulse 75   Temp 98  F (36.7  C) (Oral)   Resp 20   Ht 1.854 m (6' 1\")   Wt 97.5 kg (215 lb)   SpO2 98%   BMI 28.37 kg/m         Tate Scale:  Wrist Circumference: Study Watch Wrist Preferred Watch Wrist Dominant Hand Watch Band Tightness:   2 18.8 cm Left Left Right Secure      Watch Size Preference: 45mm    ENROLLMENT    Was the visit performed? Yes   Date of Enrollment: 03-APR-2025. All procedures below occurred on this day.    Site Zip Code: Site Time Zone:  Site Location: Protocol Assigned: Eligibility Confirmed:   [address] [place] [facility] Protocol A (COVID) Yes   Plan for Study Kit #1 Delivery: Given to Participant On-Site     [place] Device Accountability Prepped/Dispensed  Prepped & Dispensed Study Kit #1:  All Study Devices included? Yes (including Study Watch, Study Phone, Ambient Sensor, Oral Thermometer and Charging Accessories)  Is this a Replacement Kit? No    Study Phone  ID [employer] Research ID [place] ID    [id number]     Study Watch  ID Model  Band Type    [id number] Series 9 Sport Loop     Was Study Kit #1 Shipped to the Participant? No  Were all expected devices received? Yes  Were there Device Issues? No  Was the Study Kit Replaced? No    All devices " listed above were dispensed to the participant. Device ID were confirmed prior to dispensation.      Participant was thoroughly educated on study procedure and device care, staff highlighted the importance of compliance to study procedure. All questions and concerns were addressed, and informed participant to contact study coordinators for any questions. Subject was provided with a copy of the subject instructions for at home review.     Adverse Events Summary:*   Were any Adverse Events (AEs) experienced? No    Protocol Deviation Summary:*   Were there any Protocol Deviations?: No    *If Yes, please complete corresponding form.    Concomitant Medications:  As screening and enrollment appointments occurred on the same day, please refer to the concomitant medications documented above.        03-APR-2025   Irving Tian

## 2025-04-21 ENCOUNTER — VIRTUAL VISIT (OUTPATIENT)
Dept: RESEARCH | Facility: CLINIC | Age: 30
End: 2025-04-21
Payer: COMMERCIAL

## 2025-04-21 DIAGNOSIS — Z00.6 EXAMINATION OF PARTICIPANT OR CONTROL IN CLINICAL RESEARCH: Primary | ICD-10-CM

## 2025-04-21 PROCEDURE — 99207 PR NO CHARGE NURSE ONLY: CPT | Mod: 93

## 2025-04-21 NOTE — PROGRESS NOTES
[place] Pre-Pill Day Call/Virtual Visit 1 Study Note    Study Description: The purpose of this study is to explore potential relationships between physiologic parameters collected from sensor data with physiological changes potentially induced by the administration of the COVID-19 vaccine.       Subject ID: [id number]     Virtual Check In Visit 1  Time of Visit (24H): 09:06    Date/Time of Onsite Visit 1 / Pill Appointment Confirmed? Yes    Has this subject completed 4 consecutive compliant days? Yes, proceed with Onsite Visit 1    Reminders  [x] Check compliance and address any issues  [x] Are you having any issues with your study devices? No  [x] Continue to follow nightly/daily study procedures   [x] Please bring your Study Watch, Study Phone, and Subject Instructions to your next appointment.   [] Adhere to the lifestyle considerations listed in inclusion criteria number 7 (NSAIDs, Recreational drug use, strenuous exercise etc):    Particularly, REFRAIN from NSAID usage during the upcoming ingestion phase. The participant verbalized their understanding.     Additional Notes: N/A      Adverse Events Summary:*   Were any Adverse Events (AEs) experienced?  No    Protocol Deviation Summary:*   Were there any Protocol Deviations?:  No    *If Yes, please complete corresponding form.    Concomitant Medications Summary:    Has the subject taken any medications within 30 days prior to signing the informed consent and/or during the study? (Have they started any new medications since their last appointment?) No, they do not take any medications at this time.       21-APR-2025     Avelina Rider

## 2025-04-22 ENCOUNTER — ALLIED HEALTH/NURSE VISIT (OUTPATIENT)
Dept: RESEARCH | Facility: CLINIC | Age: 30
End: 2025-04-22
Payer: COMMERCIAL

## 2025-04-22 DIAGNOSIS — Z00.6 EXAMINATION OF PARTICIPANT OR CONTROL IN CLINICAL RESEARCH: Primary | ICD-10-CM

## 2025-04-22 PROCEDURE — 99207 PR NO CHARGE-RESEARCH SERVICE: CPT

## 2025-04-22 NOTE — PROGRESS NOTES
[place] Study Reconsent    Study Description: The purpose of this study is to explore potential relationships between physiologic parameters collected from sensor data with physiological changes potentially induced by the administration of the COVID-19 vaccine.    Liborio Perez a 30 year old male, was on-site today at [facility] for a study visit for the [place] (-[id number]) study.       The new version of the consent form was reviewed with the patient.     The review of the new version included:  Study Purpose      Participant Duration, Responsibilities & Expectations    Study Data and Devices   Benefits and Risks of Participation - Additional risks associated with the ingestible sensors  Compensation and Costs of Participation - data set completion bonus at study exit  Coded Study Data - Additional information   Voluntary Participation    Study Restrictions  Confidentiality Obligations/Privacy-Related Risks   Injury, Legal, and Data Rights    Authorization to Use and Disclose Your Protected Health Information    Protocol Version: 6.0   Principle Investigator: Cindy Solares MD    Subject Number: [id number]     The reconsent eConsent and HIPAA form version (Version 6.0 Date 03-APR-2025) was signed on  22-APR-2025. The updated copy of the consent form was provided to the participant and saved in the participant's medical record alongside the original consent.     All participants questions were answered and they are still willing to participate in the study.       22-APR-2025    SEMAJ Wilson

## 2025-04-22 NOTE — PROGRESS NOTES
[place] Pill Witness Study Note  Study Description: The purpose of this study is to explore potential relationships between physiologic parameters collected from sensor data with physiological changes potentially induced by the administration of the COVID-19 vaccine.       I supervised while Liborio Perez ingested the smart sensor. Smart sensor was swallowed without complication. Participant felt well after and was able proceeded with study procedures.       22-APR-2025     Sera Gant NP

## 2025-04-22 NOTE — PROGRESS NOTES
[place] Pill Day / On-Site 1 Note  Study Description: The purpose of this study is to explore potential relationships between physiologic parameters collected from sensor data with physiological changes potentially induced by the administration of the COVID-19 vaccine.    Subject ID: [id number]     Liborio Perez presents to [facility] for On-Site 1 on 22-APR-2025. All procedures below occurred on this date.     On-Site Visit 1                                                                Was the visit performed? Yes  Height/Weight and BMI confirmed? Yes  Repeat Height/Weight and BMI  There were no vitals taken for this visit.   Note: Capture of the values of Height/Weight and BMI confirmation have no bearing on recruitment binning or usable data binning. The participant will remain in their bin as assigned at screening/enrollment. Sponsor requested this information to be captured but not input in to [facility].      Pregnancy Test Needed? No  NSAID Usage: Has the participant taken an OTC pain relief or fever reducing medication or NSAID in the last 30 days? No   The participant was reminded to refrain from NSAID usage until a fever of 102.2 is reached and/or as recommended by a medical professional.  verbalized their understanding. Given this discussion today, their medication list has been updated to reflect today as the stop date of their NSAID.       CBT Device Kit Accountability   Was the [place] Dispensed? Yes  [place] ID: [id number]     Pill 1 Pill 2   Serial Number [id number]   Lot Number [id number] 24-10   Pill Activation Time 10:05 10:05   Were the above pills activated and dispensed? Yes  Only 2 pills were activated and dispensed at this visit given the prevailing 3 pill plan as outlined in section 6.2 (pg. 73) of the MOP.     Pills were activated by the Device Manager and verified by Amanda HERMAN       CBT Pill Ingestion Log     Pill 1:  Was Pill 1 ingested? Yes  Time of Ingestion:  14:10     Ingestion of Pill 1 was witnessed by Jennifer Gant NP. Please see their documentation for further details.     Adverse Events Summary:*   Were any Adverse Events (AEs) experienced?  No    Protocol Deviation Summary:*   Were there any Protocol Deviations?:  No    *If Yes, please complete corresponding form.    Concomitant Medications Summary:    Has the subject taken any medications within 30 days prior to signing the informed consent and/or during the study? (Have they started any new medications since their last visit?) Yes, and their medications have not changed since their last visit. Please refer to that documentation for the medication list.       22-APR-2025  SEMAJ Wilson

## 2025-04-23 ENCOUNTER — VIRTUAL VISIT (OUTPATIENT)
Dept: RESEARCH | Facility: CLINIC | Age: 30
End: 2025-04-23
Payer: COMMERCIAL

## 2025-04-23 DIAGNOSIS — Z00.6 EXAMINATION OF PARTICIPANT OR CONTROL IN CLINICAL RESEARCH: Primary | ICD-10-CM

## 2025-04-23 PROCEDURE — 99207 PR NO CHARGE-RESEARCH SERVICE: CPT | Mod: 93

## 2025-04-23 NOTE — PROGRESS NOTES
[facility] Pre-Vaccine Call / Virtual Visit 2 Study Note    Study Description: The purpose of this study is to explore potential relationships between physiologic parameters collected from sensor data with physiological changes potentially induced by the administration of the COVID-19 vaccine.       Subject ID:      Was the visit performed? Yes  Location:     Virtual Visit 2: 23-APR-2025  Time of Visit (24H): 15:05    Date/Time of Onsite Visit 2 / Vaccine Appointment Confirmed? Yes    Reminders  [x] Check compliance and address any issues   -If there are pending Igloo uploads, bring gateway close to the phone  [x] Are you having any issues with your study devices? No  [x] Continue to follow nightly/daily study procedures   [x] Are you sick today or experiencing any cold/flu-like symptoms today? No     -If yes, investigate if rescheduling vaccine appointment is required  [x] Please remember to ingest Pill #3 tonight/the evening before your vaccine appointment   [x] Please bring your Study Watch, Study Phone, Subject Instructions AND GATEWAY to your appointment tomorrow.       Additional Notes: His wife is being induced on Friday! 2    Adverse Events Summary:*   Were any Adverse Events (AEs) experienced?  No    Protocol Deviation Summary:*   Were there any Protocol Deviations?:  No    *If Yes, please complete corresponding form.    Concomitant Medications Summary:    Has the subject taken any medications within 30 days prior to signing the informed consent and/or during the study? (Have they started any new medications since their last appointment?)   No, they do not take any medications at this time.       23-APR-2025     Amanda Kothari

## 2025-04-24 ENCOUNTER — ALLIED HEALTH/NURSE VISIT (OUTPATIENT)
Dept: RESEARCH | Facility: CLINIC | Age: 30
End: 2025-04-24
Payer: COMMERCIAL

## 2025-04-24 DIAGNOSIS — Z23 HIGH PRIORITY FOR 2019-NCOV VACCINE: ICD-10-CM

## 2025-04-24 DIAGNOSIS — Z00.6 EXAMINATION OF PARTICIPANT OR CONTROL IN CLINICAL RESEARCH: Primary | ICD-10-CM

## 2025-04-24 NOTE — PROGRESS NOTES
[facility] Vaccine Screening & Administration Note:  Study Description: The purpose of this study is to explore potential relationships between physiologic parameters collected from sensor data with physiological changes potentially induced by the administration of the COVID-19 vaccine.    Subject ID:      I saw Liborio Perez today to review their eligibility for Covid-19 vaccination and administer the vaccine when appropriate. Vaccine information sheet (VIS) was provided to the participant.     COVID Vaccine Screening   Are you sick today or experiencing any cold/flu-like symptoms?   No  Have you received a dose of the Covid-19 vaccine before?   Yes, Cameron  Date of most recent Covid-19 vaccine dose:     18-November-2021   Do you currently have a health condition or are undergoing    treatment that makes you moderately to severely immunocompromised?* No  Have you ever had an allergic reaction to a Covid vaccine?**  No  Have you ever had an allergic reaction to another vaccine or injectable  medication?         No  Have you ever felt dizzy or faint before, during or after a shot?   No    *Ex: treatment of cancer, HIV, organ transplant recipient, immunosuppressive therapy, etc.     **This would include a severe allergic reaction (e.g., anaphylaxis that required treatment with epinephrine or caused you to go to the hospital. It would also include an allergic reaction that caused hives, swelling, or respiratory distress, including wheezing)    Vaccine Administration      Was a vaccine administered?  Yes  5 Rights of Dosing Confirmed?  Yes   -The Right patient?   -The Right drug?   -The Right time?    -The Right dose?   -The Right route?    Vaccine Administration Information  Administration Date Administration Time Initials of  Initials of Vaccine Verifier    04/24/25  1:55 GRANT AMADOR     Covid-19 Vaccine Information    Lot Number Box Number Expiration Route Arm    [employer]  [id number]  [id number] 06-September-2025 Intramuscular Left   Updated vaccination card and AVS were provided to the participant.    I examined the participant 15 minutes after administration. There were no complications like shortness of breath, throat/chest tightness, sore throat, wheezing or generalized itching. Injection site looks fine without redness, induration or swelling. Participant is able to proceed with study procedures.        24-APR-2025   Sera Gant NP      [facility] Pill Witness Study Note  Study Description: The purpose of this study is to explore potential relationships between physiologic parameters collected from sensor data with physiological changes potentially induced by the administration of the COVID-19 vaccine.       I supervised while Liborio Perez ingested the smart sensor. Smart sensor was swallowed without complication. Participant felt well after and was able proceeded with study procedures.       24-APR-2025     Sera Gant NP

## 2025-04-24 NOTE — PROGRESS NOTES
[facility] Vaccine Day /On-Site Visit 2 Note  Study Description: The purpose of this study is to explore potential relationships between physiologic parameters collected from sensor data with physiological changes potentially induced by the administration of the COVID-19 vaccine.    Subject ID: [id number]     Liborio Perez presents to [facility] for On-Site 2 on 24-APR-2025. All procedures below occurred on this day.      On-Site Visit 2                                                                Was the visit performed?: Yes   Did the participant miss any morning or evening surveys since their pill day? No  If yes, they need to complete makeup surveys    CBT Ingestion Log      Pill 2   Was the Smart Pill Ingested? Yes   Date of Ingestion 23-APR-2025   Time of Ingestion 19:02   *If Pill 2 ingestion information was collected during Virtual Visit 2, please delete the Pill 2 column.       CBT Device Kit Accountability     Was a [place] Dispensed?: Yes, [id number]      Pill 3   Serial Number [id number]   Lot Number 24-10   Pill Activation Time 13:53   Were the above pills activated and dispensed? Yes  Only 1 additional pill was activated and dispensed at this visit given the prevailing 3 pill plan as outlined in section 6.2 (pg. 73) of the MOP.     Smartpill was activated by the Device Manager and verified by AP.     CBT Pill Ingestion Log     Pill 3:  Was Pill 3 ingested? Yes  Time of Ingestion: 13:56     Please review the provider note for vaccine eligibility screening and administration information.     Adverse Events Summary:*   Were any Adverse Events (AEs) experienced?  No    Protocol Deviation Summary:*   Were there any Protocol Deviations?:  No    *If Yes, please complete corresponding form.    Concomitant Medications Summary:    Has the subject taken any medications within 30 days prior to signing the informed consent and/or during the study? (Have they started any new medications since  their last visit?) Yes, and their medications have not changed since their last visit. Please refer to that documentation for the medication list.       24-APR-2025   Irving Tian

## 2025-04-29 ENCOUNTER — VIRTUAL VISIT (OUTPATIENT)
Dept: RESEARCH | Facility: CLINIC | Age: 30
End: 2025-04-29
Payer: COMMERCIAL

## 2025-04-29 DIAGNOSIS — Z00.6 RESEARCH SUBJECT: Primary | ICD-10-CM

## 2025-04-29 PROCEDURE — 99207 PR NO CHARGE NURSE ONLY: CPT | Mod: 93

## 2025-04-29 NOTE — PROGRESS NOTES
[facility] Post-VD Call / Virtual Visit 3 Note    Study Description: The purpose of this study is to explore potential relationships between physiologic parameters collected from sensor data with physiological changes potentially induced by the administration of the COVID-19 vaccine.       Subject ID:      Was the visit performed? Yes  Location:     Virtual Visit 3: 29-APR-2025  Time of Visit (24H): 0915      Reminders  [x] Check compliance and address any issues   -If there are pending Igloo uploads, bring gateway close to the phone  [x] Are you having any issues with your study devices? No   [x] Continue to follow nightly/daily study procedures  [x] Are you sick today or experiencing any cold/flu-like symptoms? No        Additional Notes: All 3 sensors are red.     CBT Pill Ingestion Log     Only 3 pills were ingested due to the prevailing 3 pill plan as outlined in section 6.2  (pg. 73) of the MOP.    Adverse Events Summary:*   Were any Adverse Events (AEs) experienced?  No    Protocol Deviation Summary:*   Were there any Protocol Deviations?:  No    *If Yes, please complete corresponding form.    Concomitant Medications Summary:    Has the subject taken any medications within 30 days prior to signing the informed consent and/or during the study? (Have they started any new medications since their last appointment?) No, they do not take any medications at this time.       29-APR-2025   Yenni Morgan RN

## 2025-05-27 ENCOUNTER — ALLIED HEALTH/NURSE VISIT (OUTPATIENT)
Dept: RESEARCH | Facility: CLINIC | Age: 30
End: 2025-05-27
Payer: COMMERCIAL

## 2025-05-27 DIAGNOSIS — Z00.6 RESEARCH SUBJECT: Primary | ICD-10-CM

## 2025-05-27 PROCEDURE — 99207 PR NO CHARGE-RESEARCH SERVICE: CPT

## 2025-05-27 NOTE — PROGRESS NOTES
[facility] End of Study Note  -Including Device Accountability Returned and Subject Disposition    Study Description: The purpose of this study is to explore potential relationships between physiologic parameters collected from sensor data with physiological changes potentially induced by the administration of the COVID-19 vaccine.       Subject ID:        Was the visit performed?  Yes   Was Study Exit Survey Completed on the study phone?: Yes    Subject Disposition:  Did the subject complete the study? Yes   If no, Why? N/A    Which Visit did the participant exit from the study? End of Study Visit  Study Completion Date: 27-MAY-2025      [facility] Device Accountability Returned Form    Was the Device Kit Returned? Yes   Date Device Kit Returned:  27-MAY-2025   Were There Device Issues?  No   Was the Phone Returned?   Returned Phone ID: Yes  [id number]   Was the Watch Returned?   Returned Watch ID: Yes  [id number]   Was the [place] Returned?   Returned [place] ID: Yes  [id number]   Were All Chargers and Accessories Returned?  Yes        Adverse Events Summary:*   Were any Adverse Events (AEs) experienced? No    Protocol Deviation Summary:*   Were there any Protocol Deviations?:  No    *If Yes, please complete corresponding form.    Concomitant Medications Summary:    Has the subject taken any medications within 30 days prior to signing the informed consent and/or during the study? (Have they started any new medications?) No, they do not take any medications at this time.     27-MAY-2025   Amanda Kothari